# Patient Record
Sex: FEMALE | Race: WHITE | NOT HISPANIC OR LATINO | Employment: OTHER | URBAN - METROPOLITAN AREA
[De-identification: names, ages, dates, MRNs, and addresses within clinical notes are randomized per-mention and may not be internally consistent; named-entity substitution may affect disease eponyms.]

---

## 2017-02-09 ENCOUNTER — HISTORICAL (OUTPATIENT)
Dept: ADMINISTRATIVE | Facility: HOSPITAL | Age: 62
End: 2017-02-09

## 2017-02-09 LAB
BUN SERPL-MCNC: 21 MG/DL (ref 7–25)
CALCIUM SERPL-MCNC: 9 MG/DL (ref 8.4–10.3)
CHLORIDE SERPL-SCNC: 103 MMOL/L (ref 96–110)
CO2 SERPL-SCNC: 27 MMOL/L (ref 24–32)
CREAT SERPL-MCNC: 1.33 MG/DL (ref 0.7–1.1)
GLUCOSE SERPL-MCNC: 96 MG/DL (ref 65–99)
POTASSIUM SERPL-SCNC: 4.3 MMOL/L (ref 3.6–5.2)
SODIUM SERPL-SCNC: 137 MMOL/L (ref 135–146)

## 2017-02-13 ENCOUNTER — HISTORICAL (OUTPATIENT)
Dept: RADIOLOGY | Facility: HOSPITAL | Age: 62
End: 2017-02-13

## 2017-02-15 ENCOUNTER — HISTORICAL (OUTPATIENT)
Dept: RADIOLOGY | Facility: HOSPITAL | Age: 62
End: 2017-02-15

## 2017-02-15 LAB
CHOLEST SERPL-MCNC: 207 MG/DL
CHOLEST/HDLC SERPL: 4 {RATIO} (ref 0–4.4)
DEPRECATED CALCIDIOL+CALCIFEROL SERPL-MC: 18.78 NG/ML (ref 30–80)
EST. AVERAGE GLUCOSE BLD GHB EST-MCNC: 97 MG/DL
HBA1C MFR BLD: 5 % (ref 4.7–5.6)
HDLC SERPL-MCNC: 52 MG/DL
LDLC SERPL CALC-MCNC: 135 MG/DL (ref 0–130)
T4 SERPL-MCNC: 7.76 MCG/DL (ref 6.09–12.23)
TRIGL SERPL-MCNC: 101 MG/DL
TSH SERPL-ACNC: 1.33 MIU/ML (ref 0.5–5)
VLDLC SERPL CALC-MCNC: 20 MG/DL

## 2017-03-01 ENCOUNTER — HISTORICAL (OUTPATIENT)
Dept: ADMINISTRATIVE | Facility: HOSPITAL | Age: 62
End: 2017-03-01

## 2017-03-01 LAB
ALBUMIN SERPL-MCNC: 4.6 GM/DL (ref 3.4–5)
ALBUMIN/GLOB SERPL: 1 {RATIO}
ALP SERPL-CCNC: 83 UNIT/L (ref 20–120)
ALT SERPL-CCNC: 20 UNIT/L
AMYLASE SERPL-CCNC: 96 UNIT/L (ref 28–100)
AST SERPL-CCNC: 28 UNIT/L
BILIRUB SERPL-MCNC: 0.5 MG/DL
BILIRUBIN DIRECT+TOT PNL SERPL-MCNC: 0.1 MG/DL
BILIRUBIN DIRECT+TOT PNL SERPL-MCNC: 0.4 MG/DL
BUN SERPL-MCNC: 20 MG/DL (ref 7–25)
CALCIUM SERPL-MCNC: 9.3 MG/DL (ref 8.4–10.3)
CHLORIDE SERPL-SCNC: 104 MMOL/L (ref 96–110)
CO2 SERPL-SCNC: 25 MMOL/L (ref 24–32)
CREAT SERPL-MCNC: 1.17 MG/DL (ref 0.7–1.1)
ERYTHROCYTE [DISTWIDTH] IN BLOOD BY AUTOMATED COUNT: 12.8 % (ref 11.5–14.5)
ERYTHROCYTE [SEDIMENTATION RATE] IN BLOOD: 17 MM/HR (ref 0–20)
GLOBULIN SER-MCNC: 3.2 GM/ML (ref 2.3–3.5)
GLUCOSE SERPL-MCNC: 107 MG/DL (ref 65–99)
HAV IGM SERPL QL IA: NONREACTIVE
HBV CORE IGM SERPL QL IA: NONREACTIVE
HBV SURFACE AG SERPL QL IA: NEGATIVE
HCT VFR BLD AUTO: 38.1 % (ref 35–46)
HCV AB SERPL QL IA: NONREACTIVE
HGB BLD-MCNC: 12.7 GM/DL (ref 12–16)
MCH RBC QN AUTO: 30.3 PG (ref 26–34)
MCHC RBC AUTO-ENTMCNC: 33.3 GM/DL (ref 31–37)
MCV RBC AUTO: 90.9 FL (ref 80–100)
PLATELET # BLD AUTO: 324 X10(3)/MCL (ref 130–400)
PMV BLD AUTO: 10.3 FL (ref 7.4–10.4)
POTASSIUM SERPL-SCNC: 4.4 MMOL/L (ref 3.6–5.2)
PROT SERPL-MCNC: 7.8 GM/DL (ref 6–8)
RBC # BLD AUTO: 4.19 X10(6)/MCL (ref 4–5.2)
SODIUM SERPL-SCNC: 137 MMOL/L (ref 135–146)
WBC # SPEC AUTO: 11.6 X10(3)/MCL (ref 4.5–11)

## 2017-03-17 ENCOUNTER — HISTORICAL (OUTPATIENT)
Dept: INTERNAL MEDICINE | Facility: CLINIC | Age: 62
End: 2017-03-17

## 2018-12-22 ENCOUNTER — HISTORICAL (OUTPATIENT)
Dept: ADMINISTRATIVE | Facility: HOSPITAL | Age: 63
End: 2018-12-22

## 2018-12-28 LAB
FINAL CULTURE: NORMAL
FINAL CULTURE: NORMAL

## 2022-04-10 ENCOUNTER — HISTORICAL (OUTPATIENT)
Dept: ADMINISTRATIVE | Facility: HOSPITAL | Age: 67
End: 2022-04-10

## 2022-04-28 VITALS
HEIGHT: 65 IN | DIASTOLIC BLOOD PRESSURE: 94 MMHG | WEIGHT: 129.94 LBS | BODY MASS INDEX: 21.65 KG/M2 | SYSTOLIC BLOOD PRESSURE: 147 MMHG

## 2022-09-23 DIAGNOSIS — G25.81 RESTLESS LEG SYNDROME: Primary | ICD-10-CM

## 2023-03-20 ENCOUNTER — HOSPITAL ENCOUNTER (EMERGENCY)
Facility: HOSPITAL | Age: 68
Discharge: LEFT BEFORE DISCHARGE | End: 2023-03-20
Payer: MEDICARE

## 2023-03-20 VITALS
RESPIRATION RATE: 18 BRPM | SYSTOLIC BLOOD PRESSURE: 152 MMHG | WEIGHT: 127 LBS | OXYGEN SATURATION: 98 % | HEART RATE: 57 BPM | DIASTOLIC BLOOD PRESSURE: 78 MMHG | BODY MASS INDEX: 21.13 KG/M2 | TEMPERATURE: 98 F

## 2023-03-20 PROCEDURE — 99284 EMERGENCY DEPT VISIT MOD MDM: CPT | Mod: 25

## 2023-03-20 NOTE — FIRST PROVIDER EVALUATION
"Medical screening examination initiated.  I have conducted a focused provider triage encounter, findings are as follows:    Brief history of present illness:  Patient states that she "loss hearing" to her right ear x several days ago. States that was seen at urgent care and given medication with no improvement. States headache.     There were no vitals filed for this visit.    Pertinent physical exam:  Awake, alert, ambulatory      Brief workup plan:  exam    Preliminary workup initiated; this workup will be continued and followed by the physician or advanced practice provider that is assigned to the patient when roomed.  "

## 2023-05-23 DIAGNOSIS — E05.90 HYPERTHYROIDISM: Primary | ICD-10-CM

## 2023-07-25 ENCOUNTER — OFFICE VISIT (OUTPATIENT)
Dept: OTOLARYNGOLOGY | Facility: CLINIC | Age: 68
End: 2023-07-25
Payer: MEDICARE

## 2023-07-25 VITALS
WEIGHT: 108 LBS | TEMPERATURE: 98 F | DIASTOLIC BLOOD PRESSURE: 57 MMHG | SYSTOLIC BLOOD PRESSURE: 83 MMHG | HEART RATE: 62 BPM | BODY MASS INDEX: 17.97 KG/M2

## 2023-07-25 DIAGNOSIS — J34.2 NASAL SEPTAL DEVIATION: ICD-10-CM

## 2023-07-25 DIAGNOSIS — J34.89 NASAL OBSTRUCTION: ICD-10-CM

## 2023-07-25 DIAGNOSIS — H91.90 HEARING LOSS, UNSPECIFIED HEARING LOSS TYPE, UNSPECIFIED LATERALITY: Primary | ICD-10-CM

## 2023-07-25 PROCEDURE — 99213 OFFICE O/P EST LOW 20 MIN: CPT | Mod: PBBFAC

## 2023-07-25 RX ORDER — METHIMAZOLE 10 MG/1
10 TABLET ORAL
COMMUNITY
Start: 2023-06-21

## 2023-07-25 RX ORDER — FLUTICASONE PROPIONATE 50 MCG
1 SPRAY, SUSPENSION (ML) NASAL DAILY
Qty: 11.1 ML | Refills: 6 | Status: SHIPPED | OUTPATIENT
Start: 2023-07-25

## 2023-07-25 RX ORDER — LEVOCETIRIZINE DIHYDROCHLORIDE 5 MG/1
5 TABLET, FILM COATED ORAL NIGHTLY
Qty: 30 TABLET | Refills: 11 | Status: SHIPPED | OUTPATIENT
Start: 2023-07-25 | End: 2024-07-24

## 2023-07-25 RX ORDER — ROPINIROLE 4 MG/1
4 TABLET, FILM COATED ORAL
COMMUNITY
Start: 2023-06-24

## 2023-07-25 RX ORDER — METOPROLOL TARTRATE 25 MG/1
25 TABLET, FILM COATED ORAL 2 TIMES DAILY
COMMUNITY
Start: 2023-05-04

## 2023-07-25 RX ORDER — BUPROPION HYDROCHLORIDE 300 MG/1
300 TABLET ORAL EVERY MORNING
COMMUNITY
Start: 2023-05-27

## 2023-07-25 RX ORDER — LURASIDONE HYDROCHLORIDE 40 MG/1
40 TABLET, FILM COATED ORAL
COMMUNITY
Start: 2023-05-05

## 2023-07-25 RX ORDER — PANTOPRAZOLE SODIUM 40 MG/1
40 TABLET, DELAYED RELEASE ORAL 2 TIMES DAILY
COMMUNITY
Start: 2023-04-08

## 2023-07-25 RX ORDER — MONTELUKAST SODIUM 10 MG/1
10 TABLET ORAL NIGHTLY
Qty: 30 TABLET | Refills: 0 | Status: SHIPPED | OUTPATIENT
Start: 2023-07-25 | End: 2023-08-24

## 2023-07-25 RX ORDER — LISINOPRIL 10 MG/1
10 TABLET ORAL
COMMUNITY
Start: 2023-06-21

## 2023-07-25 RX ORDER — ATORVASTATIN CALCIUM 40 MG/1
40 TABLET, FILM COATED ORAL
COMMUNITY
Start: 2023-04-04

## 2023-07-25 NOTE — PROGRESS NOTES
"[facility]  Otolaryngology Clinic Note    Sandra Gresham  Encounter Date: 7/25/2023  YOB: 1955  Physician: Lia Marrero    Chief Complaint: Hearing loss    HPI: Sandra Gresham is a 68 y.o. female referred for "hyperthyroidism" but reports she is here for hearing loss. She reports she has been nearly deaf in her left year for years, but about 3 months ago woke up with near complete hearing loss on the right. She said there is some construction going on across the street but otherwise no inciting factors. Difficult to obtain clear history due to hearing loss, but denies otorrhea or otalgia. Endorses tinnitus. Denies vertigo. Takes care of her 80 y.o. sister who is nearly deaf as well. She is distressed that she can no longer listen to her CDs. She also reports significant left sided nasal obstruction, reports she broke her nose when she was younger. Stretches her left cheek out when she sleeps to help breathe. Denies facial pain or pressure of congestion/PND.     ROS:   General: Negative except per HPI  Skin: Denies rash, ulcer, or lesion.  Eyes: Denies vision changes or diplopia.  Ears: Negative except per HPI  Nose: Negative except per HPI  Throat/mouth: Negative except per HPI  Cardiovascular: Negative except per HPI  Respiratory: Negative except per HPI  Neck: Negative except per HPI  Endocrine: Negative except per HPI  Neurologic: Negative except per HPI    Other 10-point review of systems negative except per HPI      Review of patient's allergies indicates:  No Known Allergies    History reviewed. No pertinent past medical history.    History reviewed. No pertinent surgical history.    Social History     Socioeconomic History    Marital status:    Tobacco Use    Smoking status: Former     Types: Cigarettes    Smokeless tobacco: Never   Substance and Sexual Activity    Alcohol use: Not Currently       History reviewed. No pertinent family " history.    Outpatient Encounter Medications as of 7/25/2023   Medication Sig Dispense Refill    atorvastatin (LIPITOR) 40 MG tablet Take 40 mg by mouth.      buPROPion (WELLBUTRIN XL) 300 MG 24 hr tablet Take 300 mg by mouth every morning.      lisinopriL 10 MG tablet Take 10 mg by mouth.      lurasidone (LATUDA) 40 mg Tab tablet Take 40 mg by mouth.      methIMAzole (TAPAZOLE) 10 MG Tab Take 10 mg by mouth.      metoprolol tartrate (LOPRESSOR) 25 MG tablet Take 25 mg by mouth 2 (two) times daily.      pantoprazole (PROTONIX) 40 MG tablet Take 40 mg by mouth 2 (two) times daily.      rOPINIRole (REQUIP) 4 MG tablet Take 4 mg by mouth.       No facility-administered encounter medications on file as of 7/25/2023.       Physical Exam:  Vitals:    07/25/23 1103   BP: (!) 83/57   Pulse: 62   Temp: 97.8 °F (36.6 °C)   Weight: 49 kg (108 lb)       Constitutional  General Appearance: well nourished, well-developed, AAO x3, NAD  HEENT  Eyes: PEERLA, EOMI, normal conjunctivae  Ears: Hearing very poor even with loud speech   AD: auricle normal, EAC normal, TM intact, no JULIA   AS: auricle normal, EAC normal, TM intact, no JULIA  Nose: septum with rightward deviation, middle third of nose deviation, nasal obstruction L improved with modified john  OC/OP: dentition poor  Nasopharynx, Hypopharynx, and Larynx:    Indirect: attempted, limited view due to patient intolerance  Neck: soft, non-tender, no palpable lymph nodes   Thyroid region- no nodules or goiter  Neuro: CN II - XII intact bilaterally  Cardiovascular: peripheral pulses palpable  Respiratory: non-labored respirations  Psychiatric: oriented to time, place and person, no depression, anxiety or agitation  Pertinent Data:  ? LABS:  ? AUDIO:  ? CT Scan:    Imaging:   I personally reviewed the following images:    Summary of Outside Records:      Assessment/Plan:  Sandra Gresham is a 68 y.o. female with what appears to be profound hearing loss bilaterally with possible  Sudden SNHL 3 months ago. Also with nasal obstruction. Technically referred for hyperthyroidism?     Hearing loss, bilateral  - Audiogram to evaluate  - If sudden SNHL, at this point do not see any benefit to be obtained from oral or intratympanic steroids unfortunately  - Further recommendations for HA's or imaging pending audiogram    Nasal obstruction, left  - Significant septal deviation and external deformity   - Optimize nasal regimen with flonase  - Likely will need some surgical intervention in the future, septoplasty at minimum but possibly OSRP     Hyperthyroidism  - No imaging or recent TSH  - We do not manage non-surgical thyroid disease, if needs further evaluation needs to be sent to Endocrinology    RTC 4 weeks with audiogram    Lia Marrero MD  [facility] Department of Otolaryngology  [facility]

## 2023-08-22 NOTE — PROGRESS NOTES
"[facility]  Otolaryngology Clinic Note    Sandra Gresham  Encounter Date: 7/25/2023  YOB: 1955  Physician: Lia Marrero    Chief Complaint: Hearing loss    HPI: Sandra Gresham is a 68 y.o. female referred for "hyperthyroidism" but reports she is here for hearing loss. She reports she has been nearly deaf in her left year for years, but about 3 months ago woke up with near complete hearing loss on the right. She said there is some construction going on across the street but otherwise no inciting factors. Difficult to obtain clear history due to hearing loss, but denies otorrhea or otalgia. Endorses tinnitus. Denies vertigo. Takes care of her 80 y.o. sister who is nearly deaf as well. She is distressed that she can no longer listen to her CDs. She also reports significant left sided nasal obstruction, reports she broke her nose when she was younger. Stretches her left cheek out when she sleeps to help breathe. Denies facial pain or pressure of congestion/PND.     8/23/23: Returns today following audio. States her left ear has been deaf for years, that she lost left hearing over a few months. She felt her hearing in her right ear was adequate until she had a sudden loss a few months ago. Denies any significant otologic hx apart from hearing loss. She is very bothered by nasal breathing, stating she has to tape or pull her left cheek out to open her left nostril when she sleeps. Reports flonase "did absolutely nothing." Former smoker, stating she quit fairly recently.    ROS:   General: Negative except per HPI  Skin: Denies rash, ulcer, or lesion.  Eyes: Denies vision changes or diplopia.  Ears: Negative except per HPI  Nose: Negative except per HPI  Throat/mouth: Negative except per HPI  Cardiovascular: Negative except per HPI  Respiratory: Negative except per HPI  Neck: Negative except per HPI  Endocrine: Negative except per HPI  Neurologic: Negative except per " HPI    Other 10-point review of systems negative except per HPI      Review of patient's allergies indicates:  No Known Allergies    History reviewed. No pertinent past medical history.    History reviewed. No pertinent surgical history.    Social History     Socioeconomic History    Marital status:    Tobacco Use    Smoking status: Former     Types: Cigarettes    Smokeless tobacco: Never   Substance and Sexual Activity    Alcohol use: Not Currently       History reviewed. No pertinent family history.    Outpatient Encounter Medications as of 7/25/2023   Medication Sig Dispense Refill    atorvastatin (LIPITOR) 40 MG tablet Take 40 mg by mouth.      buPROPion (WELLBUTRIN XL) 300 MG 24 hr tablet Take 300 mg by mouth every morning.      lisinopriL 10 MG tablet Take 10 mg by mouth.      lurasidone (LATUDA) 40 mg Tab tablet Take 40 mg by mouth.      methIMAzole (TAPAZOLE) 10 MG Tab Take 10 mg by mouth.      metoprolol tartrate (LOPRESSOR) 25 MG tablet Take 25 mg by mouth 2 (two) times daily.      pantoprazole (PROTONIX) 40 MG tablet Take 40 mg by mouth 2 (two) times daily.      rOPINIRole (REQUIP) 4 MG tablet Take 4 mg by mouth.       No facility-administered encounter medications on file as of 7/25/2023.       Physical Exam:  Vitals:    07/25/23 1103   BP: (!) 83/57   Pulse: 62   Temp: 97.8 °F (36.6 °C)   Weight: 49 kg (108 lb)       General Appearance: well nourished/thin, well-developed, NAD  HEENT  Eyes: PEERLA, EOMI, normal conjunctivae  Ears: Hearing is very poor even with very loud speech   AD: auricle normal, EAC normal, TM intact, no JULIA   AS: auricle normal, EAC normal, TM intact, no JULIA  Nose: septum with rightward deviation, middle third of nose deviation, nasal obstruction L improved with modified john  OC/OP: dentition poor  Nasopharynx, Hypopharynx, and Larynx:    Indirect: attempted, limited view due to patient intolerance  Neck: soft, non-tender, no palpable lymph nodes   Thyroid region- no  nodules or goiter  Neuro: AAO, CN II - XII intact bilaterally  Cardiovascular: peripheral pulses palpable  Respiratory: non-labored respirations  Psychiatric: Appropriate affect/mood    Pertinent Data:  ? LABS:  ? AUDIO    Imaging:   I personally reviewed the following images:  Narrative & Impression  EXAMINATION:  CT HEAD WITHOUT CONTRAST     CLINICAL HISTORY:  Headache, new or worsening (Age >= 50y);     TECHNIQUE:  Axial scans were obtained from skull base to the vertex.     Coronal and sagittal reconstructions obtained from the axial data.     Automatic exposure control was utilized to limit radiation dose.     Contrast: None     Radiation Dose:     Total DLP: 932 mGy*cm     COMPARISON:  None     FINDINGS:  There is no acute intracranial hemorrhage or edema. The gray-white matter differentiation is preserved.  Patchy hypodensities in the subcortical and periventricular white matter and basal ganglia likely represent chronic microvascular ischemic changes.     There is no mass effect or midline shift.  There is diffuse parenchymal volume loss.  The basal cisterns are patent. There is no abnormal extra-axial fluid collection.     The calvarium and skull base are intact.  There is scattered paranasal sinus mucosal thickening.  The mastoid air cells are clear.     Impression:     1. No acute intracranial abnormality.  2. Chronic microvascular ischemic changes.        Electronically signed by: Karine York  Date:                                            03/20/2023    Assessment/Plan:  Sandra Gresham is a 68 y.o. female with significant b/l HL, (?) Sudden right SNHL 3mo ago. Audio with profound loss on the left and moderate to profound SNHL on the right, type A tymps. Also with nasal obstruction L>R. D/w Dr. Carrasco.    Hearing loss, bilateral  - Will refer to Dr. Marshall to determine CI candidacy    Nasal obstruction, left  - Significant septal deviation and external deformity   - Optimize nasal regimen with  flonase  - Likely will need some surgical intervention in the future, septoplasty at minimum but possibly OSRP     RTC 6mo on Res template to discuss surgical options for nasal obstruction    Lia Mahoney NP

## 2023-08-22 NOTE — PROGRESS NOTES
I reviewed the history and physical exam with the resident.   I agree with findings and plan.    Raghav Mercado M.D.

## 2023-08-23 ENCOUNTER — OFFICE VISIT (OUTPATIENT)
Dept: OTOLARYNGOLOGY | Facility: CLINIC | Age: 68
End: 2023-08-23
Payer: MEDICARE

## 2023-08-23 ENCOUNTER — CLINICAL SUPPORT (OUTPATIENT)
Dept: AUDIOLOGY | Facility: HOSPITAL | Age: 68
End: 2023-08-23
Payer: MEDICARE

## 2023-08-23 VITALS — TEMPERATURE: 98 F | SYSTOLIC BLOOD PRESSURE: 105 MMHG | HEART RATE: 48 BPM | DIASTOLIC BLOOD PRESSURE: 63 MMHG

## 2023-08-23 DIAGNOSIS — H91.90 HEARING LOSS, UNSPECIFIED HEARING LOSS TYPE, UNSPECIFIED LATERALITY: ICD-10-CM

## 2023-08-23 DIAGNOSIS — H90.3 SENSORINEURAL HEARING LOSS (SNHL) OF BOTH EARS: Primary | ICD-10-CM

## 2023-08-23 DIAGNOSIS — J34.89 NASAL OBSTRUCTION: ICD-10-CM

## 2023-08-23 DIAGNOSIS — J34.2 NASAL SEPTAL DEVIATION: ICD-10-CM

## 2023-08-23 DIAGNOSIS — H93.13 TINNITUS OF BOTH EARS: ICD-10-CM

## 2023-08-23 PROCEDURE — 3074F SYST BP LT 130 MM HG: CPT | Mod: CPTII,,, | Performed by: NURSE PRACTITIONER

## 2023-08-23 PROCEDURE — 4010F PR ACE/ARB THEARPY RXD/TAKEN: ICD-10-PCS | Mod: CPTII,,, | Performed by: NURSE PRACTITIONER

## 2023-08-23 PROCEDURE — 1126F PR PAIN SEVERITY QUANTIFIED, NO PAIN PRESENT: ICD-10-PCS | Mod: CPTII,,, | Performed by: NURSE PRACTITIONER

## 2023-08-23 PROCEDURE — 1126F AMNT PAIN NOTED NONE PRSNT: CPT | Mod: CPTII,,, | Performed by: NURSE PRACTITIONER

## 2023-08-23 PROCEDURE — 99214 PR OFFICE/OUTPT VISIT, EST, LEVL IV, 30-39 MIN: ICD-10-PCS | Mod: S$PBB,,, | Performed by: NURSE PRACTITIONER

## 2023-08-23 PROCEDURE — 4010F ACE/ARB THERAPY RXD/TAKEN: CPT | Mod: CPTII,,, | Performed by: NURSE PRACTITIONER

## 2023-08-23 PROCEDURE — 99214 OFFICE O/P EST MOD 30 MIN: CPT | Mod: S$PBB,,, | Performed by: NURSE PRACTITIONER

## 2023-08-23 PROCEDURE — 3078F DIAST BP <80 MM HG: CPT | Mod: CPTII,,, | Performed by: NURSE PRACTITIONER

## 2023-08-23 PROCEDURE — 3078F PR MOST RECENT DIASTOLIC BLOOD PRESSURE < 80 MM HG: ICD-10-PCS | Mod: CPTII,,, | Performed by: NURSE PRACTITIONER

## 2023-08-23 PROCEDURE — 1159F PR MEDICATION LIST DOCUMENTED IN MEDICAL RECORD: ICD-10-PCS | Mod: CPTII,,, | Performed by: NURSE PRACTITIONER

## 2023-08-23 PROCEDURE — 3074F PR MOST RECENT SYSTOLIC BLOOD PRESSURE < 130 MM HG: ICD-10-PCS | Mod: CPTII,,, | Performed by: NURSE PRACTITIONER

## 2023-08-23 PROCEDURE — 92567 TYMPANOMETRY: CPT | Performed by: AUDIOLOGIST-HEARING AID FITTER

## 2023-08-23 PROCEDURE — 92557 COMPREHENSIVE HEARING TEST: CPT | Performed by: AUDIOLOGIST-HEARING AID FITTER

## 2023-08-23 PROCEDURE — 1101F PR PT FALLS ASSESS DOC 0-1 FALLS W/OUT INJ PAST YR: ICD-10-PCS | Mod: CPTII,,, | Performed by: NURSE PRACTITIONER

## 2023-08-23 PROCEDURE — 1159F MED LIST DOCD IN RCRD: CPT | Mod: CPTII,,, | Performed by: NURSE PRACTITIONER

## 2023-08-23 PROCEDURE — 3288F FALL RISK ASSESSMENT DOCD: CPT | Mod: CPTII,,, | Performed by: NURSE PRACTITIONER

## 2023-08-23 PROCEDURE — 1101F PT FALLS ASSESS-DOCD LE1/YR: CPT | Mod: CPTII,,, | Performed by: NURSE PRACTITIONER

## 2023-08-23 PROCEDURE — 99214 OFFICE O/P EST MOD 30 MIN: CPT | Mod: PBBFAC,25 | Performed by: NURSE PRACTITIONER

## 2023-08-23 PROCEDURE — 3288F PR FALLS RISK ASSESSMENT DOCUMENTED: ICD-10-PCS | Mod: CPTII,,, | Performed by: NURSE PRACTITIONER

## 2023-08-23 NOTE — PROGRESS NOTES
Audiological Evaluation    Patient History:    Patient evaluated today to assess hearing.  She reportedly experienced a sudden loss of hearing for the right ear approximately 3 months prior.  She denied any associated symptoms such as tinnitus, otalgia, dizziness or viral symptoms. She also reports a long-standing history of hearing loss for the left ear due to unknown etiology.  Otalgia, otorrhea and a history of middle ear involvement/otologic procedures have been denied at this time.  Patient's medical history has reportedly not changed since most recent medical evaluation.       Pure Tone Testing:    Right ear:       Moderate to profound, SNHL    Left ear:          Profound, SNHL        Tympanometry:      Right ear:   Type 'A' tympanogram    Left ear: Type 'A' tympanogram           Acoustic Reflex Testing    Right ear:   Did not test     Left ear: Did not test     DPOAE Testing    Right ear: Absent emissions for the frequencies 8083-2355 Hz    Left ear:  Absent emissions for the frequencies 9431-1588 Hz       Interpretations:    Pure tone testing revealed a moderate to profound, sensorineural hearing loss for the right ear.  Testing for the left ear revealed a profound, sensorineural hearing loss.  A speech reception threshold was obtained at 70 dB HL for the right ear consistent with pure tone results.  Word recognition score was poor (10%) for the right ear. Speech testing was attempted for the left ear, however no response was noted at the equipment limits of 100 dB HL.  Immittance testing revealed Type A tympanograms, bilaterally, indicative of normal middle ear function. DPOAE testing revealed absent emissions, bilaterally.  Otoscopy revealed clear EACs, bilaterally.      Recommendations:     Audiological testing annually  ENT evaluation due onset of hearing loss AS  Hearing protection when exposed to hazardous noise      Kermit Goodman.  Clinical Audiologist

## 2024-01-04 ENCOUNTER — OFFICE VISIT (OUTPATIENT)
Dept: OTOLARYNGOLOGY | Facility: CLINIC | Age: 69
End: 2024-01-04
Payer: MEDICARE

## 2024-01-04 VITALS
WEIGHT: 104.38 LBS | SYSTOLIC BLOOD PRESSURE: 110 MMHG | BODY MASS INDEX: 17.39 KG/M2 | TEMPERATURE: 97 F | DIASTOLIC BLOOD PRESSURE: 67 MMHG | RESPIRATION RATE: 20 BRPM | HEIGHT: 65 IN | OXYGEN SATURATION: 99 % | HEART RATE: 76 BPM

## 2024-01-04 DIAGNOSIS — M95.0 NASAL VALVE COLLAPSE: ICD-10-CM

## 2024-01-04 DIAGNOSIS — H91.8X3 ASYMMETRICAL HEARING LOSS: ICD-10-CM

## 2024-01-04 DIAGNOSIS — J34.2 NASAL SEPTAL DEVIATION: ICD-10-CM

## 2024-01-04 DIAGNOSIS — H90.3 SENSORINEURAL HEARING LOSS (SNHL) OF BOTH EARS: Primary | ICD-10-CM

## 2024-01-04 DIAGNOSIS — J34.89 NASAL OBSTRUCTION: ICD-10-CM

## 2024-01-04 PROCEDURE — 99215 OFFICE O/P EST HI 40 MIN: CPT | Mod: PBBFAC | Performed by: STUDENT IN AN ORGANIZED HEALTH CARE EDUCATION/TRAINING PROGRAM

## 2024-01-04 NOTE — PROGRESS NOTES
"[facility]  Otolaryngology Clinic Note    Sandra Gresham  Encounter Date: 01/04/2024  YOB: 1955  Physician: Yong Sarmiento MD    Chief Complaint: Hearing loss    HPI: Sandra Gresham is a 68 y.o. female referred for "hyperthyroidism" but reports she is here for hearing loss. She reports she has been nearly deaf in her left year for years, but about 3 months ago woke up with near complete hearing loss on the right. She said there is some construction going on across the street but otherwise no inciting factors. Difficult to obtain clear history due to hearing loss, but denies otorrhea or otalgia. Endorses tinnitus. Denies vertigo. Takes care of her 80 y.o. sister who is nearly deaf as well. She is distressed that she can no longer listen to her CDs. She also reports significant left sided nasal obstruction, reports she broke her nose when she was younger. Stretches her left cheek out when she sleeps to help breathe. Denies facial pain or pressure of congestion/PND.     8/23/23: Returns today following audio. States her left ear has been deaf for years, that she lost left hearing over a few months. She felt her hearing in her right ear was adequate until she had a sudden loss a few months ago. Denies any significant otologic hx apart from hearing loss. She is very bothered by nasal breathing, stating she has to tape or pull her left cheek out to open her left nostril when she sleeps. Reports flonase "did absolutely nothing." Former smoker, stating she quit fairly recently.    1/4/24:  Patient is here today for hearing loss and nasal obstruction.  Regarding her hearing, she does not recall being referred to anybody for a cochlear implant.  She is never tried any hearing aids before she still has significant difficulty hearing on both sides, left worse than right.  Recall that on the left, she had a sudden SNHL three years ago.  She has never had an MRI to evaluate this.  " "She really wants to hear well because she takes care of her other sisters who actually have worse hearing than she does. She still has significant difficulty with nasal obstruction and sometimes even tapes her cheek open when she sleeps so that she can breathe better.  She has used Flonase for several months but has had no effect whatsoever.  She was a former smoker, but is no longer smoking. She has never used intranasal drugs. She ran into a wall when she was a kid and think that start it. She did reportedly have a rhinoplasty "40 years ago" but "it broke down." She does have congestive heart failure.       Review of patient's allergies indicates:  No Known Allergies    No past medical history on file.    No past surgical history on file.    Social History     Socioeconomic History    Marital status:    Tobacco Use    Smoking status: Former     Current packs/day: 0.00     Types: Cigarettes     Quit date: 2021     Years since quitting: 3.0    Smokeless tobacco: Never   Substance and Sexual Activity    Alcohol use: Not Currently       Physical Exam:  Vitals:    01/04/24 1250   BP: 110/67   Pulse: 76   Resp: 20   Temp: 97.3 °F (36.3 °C)       General Appearance: well nourished/thin, well-developed, NAD  HEENT  Eyes: PEERLA, EOMI, normal conjunctivae  Ears (Under otomicroscopy): Hearing is very poor even with very loud speech. Had to yell in right ear   AD: auricle normal, EAC normal, TM intact, no JULIA   AS: auricle normal, EAC normal, TM intact, no JULIA  Nose: Upper 1/3 with bony abnormalities but grossly straight.  Middle 3rd deviated to left and if deviated to right.  Her caudal septum is deviated to the right anteriorly and to the left more posteriorly.  On inspiration, she has dynamic collapse of her middle thirds bilaterally.  Without manipulation, she can breathe better on the right.  Nasal obstruction is improved with both [place] and modified john on both sides. Bubous tip. Mild tip ptosis. See " "pictures below.   OC/OP: dentition poor, several missing teeth  Neck: soft, non-tender, no palpable lymph nodes  Neuro: AAO, CN II - XII intact bilaterally  Psychiatric: Appropriate affect/mood    Patient Photos 1/4/24, more views in media section of epic            Pertinent Data:  ? LABS:  ? AUDIO    Assessment/Plan:  Sandra Gresham is a 68 y.o. female with profound R SNHL and moderate to profound L SNHL as well as singificant nasal obstruction related to internal nasal valve collapse despite a rhinoplasty performed years ago.     SNHL, bilateral but asymmetric and hx L sudden SNHL  - Pt has never had MRI; will obtain to ensure no retrocochlear pathology  - Patient has also never had hearing aids, will give information for hearing aids. She would likely need Bi[employer] hearing aids.   - Depending on how she does with hearing aids, could consider referring her for cochlear implant in future  - Patient has also inquired about "[employer]," a phone that provides captions for phone calls. After visiting the website it appears there is funding through the [employer] for this as part of the ADA. I have filled out the paperwork for this and faxed it along with her audiogram to [employer].     Nasal obstruction and internal nasal valve collapse  - Patient has exceedingly poor structural support in septum and nasal cartilage, as well as external nasal deformity, which will not be alleviated by endonasal septoplasty alone. She also has no improvement on intranasal corticosteroids. She will need OSRP in future if she desires correction  - Will obtain CT Sinus to evaluate septum posteriorly as well as nasal bones  - Pictures taken today, see media tab  - Significant septal deviation and external deformity   - Optimize nasal regimen with flonase  - Likely will need some surgical intervention in the future, septoplasty at minimum but possibly OSRP     RTC 6 weeks with MRI IAC & CT Sinus    Yong Sarmiento MD  [employer] " Otolaryngology PGY-4  01/04/2024 1:31 PM